# Patient Record
Sex: MALE | Race: WHITE | NOT HISPANIC OR LATINO
[De-identification: names, ages, dates, MRNs, and addresses within clinical notes are randomized per-mention and may not be internally consistent; named-entity substitution may affect disease eponyms.]

---

## 2020-12-15 PROBLEM — Z00.00 ENCOUNTER FOR PREVENTIVE HEALTH EXAMINATION: Status: ACTIVE | Noted: 2020-12-15

## 2020-12-21 ENCOUNTER — APPOINTMENT (OUTPATIENT)
Dept: PHYSICAL MEDICINE AND REHAB | Facility: CLINIC | Age: 35
End: 2020-12-21
Payer: COMMERCIAL

## 2020-12-21 VITALS — RESPIRATION RATE: 16 BRPM | OXYGEN SATURATION: 97 % | HEIGHT: 77 IN | WEIGHT: 225 LBS | BODY MASS INDEX: 26.57 KG/M2

## 2020-12-21 DIAGNOSIS — Z78.9 OTHER SPECIFIED HEALTH STATUS: ICD-10-CM

## 2020-12-21 PROCEDURE — 99204 OFFICE O/P NEW MOD 45 MIN: CPT

## 2020-12-21 PROCEDURE — 99072 ADDL SUPL MATRL&STAF TM PHE: CPT

## 2020-12-21 RX ORDER — IBUPROFEN 800 MG/1
TABLET, FILM COATED ORAL
Refills: 0 | Status: ACTIVE | COMMUNITY

## 2020-12-21 RX ORDER — VALACYCLOVIR 500 MG/1
500 TABLET, FILM COATED ORAL
Qty: 30 | Refills: 0 | Status: ACTIVE | COMMUNITY
Start: 2020-11-16

## 2020-12-21 RX ORDER — TRIAMCINOLONE ACETONIDE 1 MG/G
0.1 CREAM TOPICAL
Qty: 30 | Refills: 0 | Status: ACTIVE | COMMUNITY
Start: 2020-11-16

## 2020-12-21 RX ORDER — CYCLOBENZAPRINE HYDROCHLORIDE 10 MG/1
10 TABLET, FILM COATED ORAL
Qty: 30 | Refills: 0 | Status: ACTIVE | COMMUNITY
Start: 2020-12-02

## 2020-12-21 RX ORDER — METHYLPREDNISOLONE 4 MG/1
4 TABLET ORAL
Qty: 21 | Refills: 0 | Status: ACTIVE | COMMUNITY
Start: 2020-12-02

## 2020-12-21 NOTE — PHYSICAL EXAM
[FreeTextEntry1] : GEN: AAOx3, NAD.\par PSYCH: Normal mood and affect. Responds appropriately to commands.\par EYES: Sclerae Anicteric. No discharge. EOMI.\par RESP: Breathing unlabored.\par CV: DP pulses 2+ and equal. No varicosities noted.\par EXT: No C/C/E.\par SKIN: No lesions noted.\par STRENGTH: 5/5 bilateral hip flexors, knee extensors, knee flexors, ankle dorsiflexors, long toe extensors, ankle plantar flexors, hip extensors, hip abductors.\par TONE: Normal, No clonus.\par REFLEXES: Symmetric patella, medial hamstring, achilles. Plantars downgoing bilaterally.\par SENS: Grossly intact to light touch bilateral lower extremities.\par INSP: Spine alignment is midline, with no evidence of scoliosis.\par STANCE: No Trendelenburg with single leg stance.\par GAIT: Non antalgic, normal reciprocating heel to toe\par LUMBAR ROM: Flexion limited (+) axial/RLE Sx. Extension, side-bending, rotation, oblique extension all full and pain free.  \par HIP ROM: Full and pain free bilaterally.\par PALP: There is no tenderness over the midline spinous processes, paravertebral muscles, SIJ, or greater trochanters bilaterally.\par SPECIAL: SLR and Slump (+) R w/ crossover. FADIR, ELBA negative bilaterally.

## 2020-12-21 NOTE — HISTORY OF PRESENT ILLNESS
[FreeTextEntry1] : Mr. JACOBO VALENZUELA is a very pleasant 35 year male who comes in for evaluation of lower back pain that has been ongoing for 8 weeks without any specific injury or inciting event. The patient has been doing PT/HEP for the past 2 months without improvement. He has also done a trial of oral steroids which provided some temporary relief, as well as Ibuprofen/Flexeril which provided symptomatic relief. The pain is located primarily on his lower back radiating to right leg/buttock intermittent in nature and described as sharp. The pain is rated as 5/10 during today's visit, and ranges from 2-8/10. The patient's symptoms are aggravated by sitting and alleviated by standing. The patient denies any night pain, numbness/tingling, weakness, or bowel/bladder dysfunction. The patient has no other complaints at this time.\par

## 2020-12-21 NOTE — ASSESSMENT
[FreeTextEntry1] : Impression:\par 1. Right L5/S1 Radiculopathy\par \par Plan: The history, physical examination, and imaging were reviewed. Symptoms are consistent with Right L5/S1 Radiculopathy due to L4/5 disc protrusion. The imaging results and diagnosis were reviewed with the patient along with treatment options. The patient has exhausted conservative management without improvement and is interested in interventional options for symptom reduction. I have offered the patient the option of an epidural steroid injection. We discussed all the risks, benefits, alternative treatments, and prognosis. The patient expressed understanding and would like to move forward. We will schedule for the injection as well as follow up post-procedure. We will plan for Right L5 S1 TFESI. The patient expressed verbal understanding and is in agreement with the plan of care. All of the patient's questions and concerns were addressed during today's visit.\par

## 2020-12-21 NOTE — DATA REVIEWED
[FreeTextEntry1] : MR LS 12/2020: L4-5 right paracentral/subarticular focal disc herniation with right lateral recess stenosis resulting in compression upon the right L5 and S1 nerve roots.\par

## 2021-01-07 ENCOUNTER — APPOINTMENT (OUTPATIENT)
Dept: PHYSICAL MEDICINE AND REHAB | Facility: CLINIC | Age: 36
End: 2021-01-07
Payer: COMMERCIAL

## 2021-01-07 PROCEDURE — 99072 ADDL SUPL MATRL&STAF TM PHE: CPT

## 2021-01-07 PROCEDURE — 64484 NJX AA&/STRD TFRM EPI L/S EA: CPT | Mod: RT

## 2021-01-07 PROCEDURE — 64483 NJX AA&/STRD TFRM EPI L/S 1: CPT | Mod: RT

## 2021-01-28 ENCOUNTER — APPOINTMENT (OUTPATIENT)
Dept: PHYSICAL MEDICINE AND REHAB | Facility: CLINIC | Age: 36
End: 2021-01-28
Payer: COMMERCIAL

## 2021-01-28 PROCEDURE — 99443: CPT

## 2021-02-23 RX ORDER — METHOCARBAMOL 750 MG/1
750 TABLET, FILM COATED ORAL
Qty: 30 | Refills: 1 | Status: ACTIVE | COMMUNITY
Start: 2020-12-21 | End: 1900-01-01

## 2022-05-03 ENCOUNTER — APPOINTMENT (OUTPATIENT)
Dept: PHYSICAL MEDICINE AND REHAB | Facility: CLINIC | Age: 37
End: 2022-05-03
Payer: COMMERCIAL

## 2022-05-03 VITALS — HEIGHT: 77 IN | WEIGHT: 225 LBS | BODY MASS INDEX: 26.57 KG/M2

## 2022-05-03 PROCEDURE — 99214 OFFICE O/P EST MOD 30 MIN: CPT

## 2022-05-03 NOTE — ASSESSMENT
[FreeTextEntry1] : Impression:\par 1. Right L5/S1 Radiculopathy\par \par Plan: The history, physical examination, and imaging were reviewed. Symptoms remain consistent with acute on chronic Right L5/S1 Radiculopathy due to L4/5 disc protrusion. The imaging results and diagnosis were reviewed with the patient along with treatment options. The patient has implemented therapeutic exercise along with NSAIDs/Muscle relaxers over the past 6-8 weeks with some improvement and is interested in interventional options for symptom reduction. I have offered the patient the option of an epidural steroid injection. We discussed all the risks, benefits, alternative treatments, and prognosis. The patient expressed understanding and would like to move forward. We will schedule for the injection as well as follow up post-procedure. We will plan for Right L5 S1 TFESI. The patient expressed verbal understanding and is in agreement with the plan of care. All of the patient's questions and concerns were addressed during today's visit.

## 2022-05-03 NOTE — PHYSICAL EXAM
[FreeTextEntry1] : GEN: AAOx3, NAD.\par STRENGTH: 5/5 bilateral hip flexors, knee extensors, knee flexors, ankle dorsiflexors, long toe extensors, ankle plantar flexors, hip extensors, hip abductors.\par TONE: Normal, No clonus.\par REFLEXES: Symmetric patella, medial hamstring, achilles. Plantars downgoing bilaterally.\par SENS: Grossly intact to light touch bilateral lower extremities.\par INSP: Spine alignment is midline, with no evidence of scoliosis.\par STANCE: No Trendelenburg with single leg stance.\par GAIT: Non antalgic, normal reciprocating heel to toe\par LUMBAR ROM: Flexion limited (+) axial/RLE Sx. Extension, side-bending, rotation, oblique extension all full and pain free.  \par HIP ROM: Full and pain free bilaterally.\par PALP: There is no tenderness over the midline spinous processes, paravertebral muscles, SIJ, or greater trochanters bilaterally.\par SPECIAL: SLR and Slump (+) R w/ crossover. FADIR ELBA negative bilaterally.

## 2022-05-03 NOTE — HISTORY OF PRESENT ILLNESS
[FreeTextEntry1] : Mr. JACOBO VALENZUELA is a very pleasant 35 year male who comes in for evaluation of lower back and RIGHT leg pain. The patient underwent ELLIE Jan 2021 which provided excellent relief and has been doing PT/HEP since with good maintenance. Recently he noticed symptoms starting to return. He increased his HEP for the past few weeks which did provide some improvement however symptoms persist. The pain is located primarily on his lower back radiating to right leg/buttock intermittent in nature and described as dull. The pain is rated as 4/10 during today's visit, and ranges from 3-6/10. The patient's symptoms are aggravated by sitting and alleviated by standing. The patient denies any night pain, numbness/tingling, weakness, or bowel/bladder dysfunction. The patient has no other complaints at this time.\par

## 2022-05-19 ENCOUNTER — APPOINTMENT (OUTPATIENT)
Dept: PHYSICAL MEDICINE AND REHAB | Facility: CLINIC | Age: 37
End: 2022-05-19
Payer: COMMERCIAL

## 2022-05-19 PROCEDURE — 64484 NJX AA&/STRD TFRM EPI L/S EA: CPT | Mod: RT

## 2022-05-19 PROCEDURE — 64483 NJX AA&/STRD TFRM EPI L/S 1: CPT | Mod: RT

## 2022-08-31 ENCOUNTER — APPOINTMENT (OUTPATIENT)
Dept: PHYSICAL MEDICINE AND REHAB | Facility: CLINIC | Age: 37
End: 2022-08-31

## 2022-08-31 VITALS
OXYGEN SATURATION: 100 % | SYSTOLIC BLOOD PRESSURE: 121 MMHG | HEART RATE: 52 BPM | HEIGHT: 77 IN | WEIGHT: 225 LBS | DIASTOLIC BLOOD PRESSURE: 75 MMHG | BODY MASS INDEX: 26.57 KG/M2

## 2022-08-31 PROCEDURE — 99214 OFFICE O/P EST MOD 30 MIN: CPT

## 2022-08-31 NOTE — ASSESSMENT
[FreeTextEntry1] : Impression:\par 1. Right L5/S1 Radiculopathy\par \par Plan: The history, physical examination, and imaging were reviewed. Symptoms remain consistent with acute on chronic Right L5/S1 Radiculopathy due to L4/5 disc protrusion. The imaging results and diagnosis were reviewed with the patient along with treatment options. The patient has been able to perform at a high level athletically however has persistent symptoms during day to day activities. We will plan for repeat ELLIE and will obtain new MRI LSpine prior to injection. I have provided a referral for PT with [facility] Caceres as well as acupuncture. The patient expressed understanding and would like to move forward. We will review the MRI and schedule for the injection. We will plan for Right L4 L5 TFESI. The patient expressed verbal understanding and is in agreement with the plan of care. All of the patient's questions and concerns were addressed during today's visit.

## 2022-08-31 NOTE — HISTORY OF PRESENT ILLNESS
[FreeTextEntry1] : Mr. JACOBO VALENZUELA is a very pleasant 35 year male who comes in for evaluation of lower back and RIGHT leg pain. The patient underwent ELLIE May 2022 which provided excellent relief and has been doing PT/HEP since with good maintenance. Over the summer months he has been playing competitive beach volleyball intensely, which has not bothered him, however symptoms have been problematic at night while sleeping or prolonged sitting. The pain is located primarily on his lower back radiating to right leg/buttock intermittent in nature and described as dull. The pain is rated as 4/10 during today's visit, and ranges from 3-6/10. The patient's symptoms are aggravated by sitting and alleviated by standing. The patient denies any night pain, numbness/tingling, weakness, or bowel/bladder dysfunction. The patient has no other complaints at this time.\par

## 2022-09-07 ENCOUNTER — APPOINTMENT (OUTPATIENT)
Dept: MRI IMAGING | Facility: CLINIC | Age: 37
End: 2022-09-07

## 2022-09-09 ENCOUNTER — APPOINTMENT (OUTPATIENT)
Dept: PHYSICAL MEDICINE AND REHAB | Facility: CLINIC | Age: 37
End: 2022-09-09

## 2022-09-09 DIAGNOSIS — M51.17 INTERVERTEBRAL DISC DISORDERS WITH RADICULOPATHY, LUMBOSACRAL REGION: ICD-10-CM

## 2022-09-09 PROCEDURE — 99443: CPT

## 2022-09-22 ENCOUNTER — APPOINTMENT (OUTPATIENT)
Dept: PHYSICAL MEDICINE AND REHAB | Facility: CLINIC | Age: 37
End: 2022-09-22

## 2022-09-22 PROCEDURE — 64483 NJX AA&/STRD TFRM EPI L/S 1: CPT | Mod: RT

## 2022-11-15 PROBLEM — M51.17 INTERVERTEBRAL DISC DISORDERS WITH RADICULOPATHY, LUMBOSACRAL REGION: Status: ACTIVE | Noted: 2020-12-21
